# Patient Record
(demographics unavailable — no encounter records)

---

## 2024-12-05 NOTE — PHYSICAL EXAM
[Normal Sclera/Conjunctiva] : normal sclera/conjunctiva [Normal] : no joint swelling and grossly normal strength and tone [Coordination Grossly Intact] : coordination grossly intact [Speech Grossly Normal] : speech grossly normal [Memory Grossly Normal] : memory grossly normal [Alert and Oriented x3] : oriented to person, place, and time [de-identified] : Anxious [de-identified] : anxious

## 2024-12-05 NOTE — PHYSICAL EXAM
[Normal Sclera/Conjunctiva] : normal sclera/conjunctiva [Normal] : no joint swelling and grossly normal strength and tone [Coordination Grossly Intact] : coordination grossly intact [Speech Grossly Normal] : speech grossly normal [Memory Grossly Normal] : memory grossly normal [Alert and Oriented x3] : oriented to person, place, and time [de-identified] : Anxious [de-identified] : anxious

## 2024-12-05 NOTE — HEALTH RISK ASSESSMENT
[Monthly or less (1 pt)] : Monthly or less (1 point) [1 or 2 (0 pts)] : 1 or 2 (0 points) [Never (0 pts)] : Never (0 points) [Yes] : In the past 12 months have you used drugs other than those required for medical reasons? Yes [No falls in past year] : Patient reported no falls in the past year [0] : 2) Feeling down, depressed, or hopeless: Not at all (0) [Never] : Never [Audit-CScore] : 1 [de-identified] : marijuana [de-identified] : walking [de-identified] : ok [[id number]] : 0

## 2024-12-05 NOTE — HEALTH RISK ASSESSMENT
[Monthly or less (1 pt)] : Monthly or less (1 point) [1 or 2 (0 pts)] : 1 or 2 (0 points) [Never (0 pts)] : Never (0 points) [Yes] : In the past 12 months have you used drugs other than those required for medical reasons? Yes [No falls in past year] : Patient reported no falls in the past year [0] : 2) Feeling down, depressed, or hopeless: Not at all (0) [Never] : Never [Audit-CScore] : 1 [de-identified] : marijuana [de-identified] : walking [de-identified] : ok [ABD7Lvond] : 0

## 2024-12-05 NOTE — REVIEW OF SYSTEMS
[Fever] : no fever [Earache] : no earache [Chest Pain] : no chest pain [Shortness Of Breath] : no shortness of breath [Wheezing] : no wheezing [Dysuria] : no dysuria [Joint Pain] : no joint pain [Itching] : no itching [Headache] : no headache [Anxiety] : no anxiety

## 2024-12-05 NOTE — HISTORY OF PRESENT ILLNESS
[FreeTextEntry1] : ROUTINE F/U [de-identified] : sees cardiology Dr Flowers and stress test echo were negative 2 weeks ago last colonoscopy 2 yrs ago Dr SANTA PSYCHIATRY on mirtazapine and ativan continues to engage in intermittent binge driking

## 2024-12-05 NOTE — ASSESSMENT
[FreeTextEntry1] : ongoing psych issues drinks in pattern c/w binge EtOH disorder complete cardiology evaluation was negative[s/p stent] Dr Lancaster sees Dr Santana regularly UTD on colonoscopy

## 2024-12-05 NOTE — HISTORY OF PRESENT ILLNESS
[FreeTextEntry1] : ROUTINE F/U [de-identified] : sees cardiology Dr Flowers and stress test echo were negative 2 weeks ago last colonoscopy 2 yrs ago Dr SANTA PSYCHIATRY on mirtazapine and ativan continues to engage in intermittent binge driking

## 2024-12-05 NOTE — PLAN
[FreeTextEntry1] : BP is elevated patient believes situational as BP normal at home using wrist device will start Norvasc to RTO  3 weeks with BP device and readings to consider use of wegovy for weight management and EtOH management

## 2025-05-08 NOTE — PHYSICAL EXAM
[Left] : left shoulder [Moderate] : moderate [4 ___] : forward flexion 4[unfilled]/5 [] : no tenderness to palpation [Sitting] : sitting [4___] : internal rotation 4[unfilled]/5 [TWNoteComboBox7] : active forward flexion 170 degrees [TWNoteComboBox6] : internal rotation sacrum [de-identified] : external rotation 45 degrees

## 2025-05-08 NOTE — ASSESSMENT
[FreeTextEntry1] : Underlying pathology reviewed and treatment options discussed. 05/23/2024: OC c-spine x-rays, 2 views, reveal severe mid c-spine degenerative changes.  Lt shoulder x-rays 2v- reveal OA changes.  Obtain MRI LT shoulder R/O tear.  Activity modification as tolerated. Questions addressed.  05/08/2025: We reviewed his course.  He has significant weakness in the arm and has not improved in over a year. Obtain MRI of LT shoulder. R/O RTC tear.  Start PT and HEP to improve mechanics and reduce pain. I recommend he follows up with a neurologist to eval for radiation down the spine.  Questions answered.  Follow up after MRI LT shoulder   The documentation recorded by the scribe accurately reflects the service I personally performed and the decisions made by me. I, Chau Morales, attest that this documentation has been prepared under the direction and in the presence of Provider Liborio Little MD.  The patient was seen by Liborio Little MD.

## 2025-05-08 NOTE — HISTORY OF PRESENT ILLNESS
[Gradual] : gradual [Result of repetitive motion] : result of repetitive motion [Constant] : constant [Nothing helps with pain getting better] : Nothing helps with pain getting better [Retired] : Work status: retired [de-identified] : 05/08/25- follow up for left shoulder, stated MRI was denied. stated pain lately has been getting worse. bit of numbness in fingertips. stated difficulties with lifting right arm continue. He reports upper abdominal pain/tightness. Went to his cardiologist and gastroenterologist, got work up done and everything was fine. Had CT scan of abdomen done abt 3 months ago which was also fine.    05/23/2024: 68 y/o RHD male c/o chronic left arm weakness. He did have an injury 8-9 years when he fell onto his arm. Notes difficulty keeping his arm raised or lifting items in from of him. Occasional tingling.   [] : Post Surgical Visit: no [FreeTextEntry5] : old injury from few years ago  [FreeTextEntry6] : weakness

## 2025-05-20 NOTE — DISCUSSION/SUMMARY
[de-identified] : The patient was advised of the diagnosis. The natural history of the pathology was explained in full to the patient in layman's terms. The risks and benefits of surgical and non-surgical treatment alternatives were explained in full to the patient. All questions were answered.

## 2025-05-20 NOTE — DATA REVIEWED
[MRI] : MRI [Left] : left [Shoulder] : shoulder [Report was reviewed and noted in the chart] : The report was reviewed and noted in the chart [I independently reviewed and interpreted images and report] : I independently reviewed and interpreted images and report [FreeTextEntry1] : MRI LT shoulder OC 05/14/25: 1. 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  2. Rotator cuff tendinopathy, biceps tenosynovitis, AC joint arthrosis and lateral acromial bone spurs without acute fracture or loose body.  3. Severe deltoid and infraspinatus muscle atrophy and mild superior subscapularis muscle atrophy of uncertain clinical significance and etiology.

## 2025-05-20 NOTE — DISCUSSION/SUMMARY
[de-identified] : The patient was advised of the diagnosis. The natural history of the pathology was explained in full to the patient in layman's terms. The risks and benefits of surgical and non-surgical treatment alternatives were explained in full to the patient. All questions were answered.

## 2025-05-20 NOTE — HISTORY OF PRESENT ILLNESS
[Gradual] : gradual [Result of repetitive motion] : result of repetitive motion [Constant] : constant [Nothing helps with pain getting better] : Nothing helps with pain getting better [Retired] : Work status: retired [de-identified] : 05/20/2025: Patient returns for LT shoulder FUV. He still c/o pain with forward flexion. Here to review MRI.   05/08/25- follow up for left shoulder, stated MRI was denied. stated pain lately has been getting worse. bit of numbness in fingertips. stated difficulties with lifting right arm continue. He reports upper abdominal pain/tightness. Went to his cardiologist and gastroenterologist, got work up done and everything was fine. Had CT scan of abdomen done abt 3 months ago which was also fine.    05/23/2024: 66 y/o RHD male c/o chronic left arm weakness. He did have an injury 8-9 years when he fell onto his arm. Notes difficulty keeping his arm raised or lifting items in from of him. Occasional tingling.   [] : Post Surgical Visit: no [FreeTextEntry5] : old injury from few years ago  [FreeTextEntry6] : weakness

## 2025-05-20 NOTE — PHYSICAL EXAM
[Left] : left shoulder [Sitting] : sitting [Moderate] : moderate [4 ___] : forward flexion 4[unfilled]/5 [4___] : internal rotation 4[unfilled]/5 [] : no tenderness to palpation [TWNoteComboBox7] : active forward flexion 170 degrees [TWNoteComboBox6] : internal rotation sacrum [de-identified] : external rotation 45 degrees

## 2025-05-20 NOTE — ASSESSMENT
[FreeTextEntry1] : Underlying pathology reviewed and treatment options discussed. 05/23/2024: OC c-spine x-rays, 2 views, reveal severe mid c-spine degenerative changes.  Lt shoulder x-rays 2v- reveal OA changes.  Obtain MRI LT shoulder R/O tear.  Activity modification as tolerated. Questions addressed.  05/08/2025: We reviewed his course.  He has significant weakness in the arm and has not improved in over a year. Obtain MRI of LT shoulder. R/O RTC tear.  Start PT and HEP to improve mechanics and reduce pain. I recommend he follows up with a neurologist to eval for radiation down the spine.  Questions answered.  Follow up after MRI LT shoulder   05/20/2025: MRI reviewed and discussed. Based on my independent interpretation of the MRI images there's evidence suggesting GH joint instability, prior anterior shoulder dislocation, and severe deltoid and infraspinatus muscle atrophy.   Underlying pathology and treatment options discussed.  Obtain EMG of LT UE.  Activity modification as tolerated. Questions addressed. Follow up after EMG.  The documentation recorded by the scribe accurately reflects the service I personally performed and the decisions made by me. I, Chau Morales, attest that this documentation has been prepared under the direction and in the presence of Provider Liborio Little MD.  The patient was seen by Liborio Little MD.

## 2025-05-20 NOTE — HISTORY OF PRESENT ILLNESS
[Gradual] : gradual [Result of repetitive motion] : result of repetitive motion [Constant] : constant [Nothing helps with pain getting better] : Nothing helps with pain getting better [Retired] : Work status: retired [de-identified] : 05/20/2025: Patient returns for LT shoulder FUV. He still c/o pain with forward flexion. Here to review MRI.   05/08/25- follow up for left shoulder, stated MRI was denied. stated pain lately has been getting worse. bit of numbness in fingertips. stated difficulties with lifting right arm continue. He reports upper abdominal pain/tightness. Went to his cardiologist and gastroenterologist, got work up done and everything was fine. Had CT scan of abdomen done abt 3 months ago which was also fine.    05/23/2024: 68 y/o RHD male c/o chronic left arm weakness. He did have an injury 8-9 years when he fell onto his arm. Notes difficulty keeping his arm raised or lifting items in from of him. Occasional tingling.   [] : Post Surgical Visit: no [FreeTextEntry5] : old injury from few years ago  [FreeTextEntry6] : weakness

## 2025-05-20 NOTE — PHYSICAL EXAM
[Left] : left shoulder [Sitting] : sitting [Moderate] : moderate [4 ___] : forward flexion 4[unfilled]/5 [4___] : internal rotation 4[unfilled]/5 [] : no tenderness to palpation [TWNoteComboBox7] : active forward flexion 170 degrees [TWNoteComboBox6] : internal rotation sacrum [de-identified] : external rotation 45 degrees

## 2025-06-04 NOTE — PHYSICAL EXAM
[Left] : left shoulder [Sitting] : sitting [Moderate] : moderate [4 ___] : forward flexion 4[unfilled]/5 [4___] : internal rotation 4[unfilled]/5 [] : motor and sensory intact distally [TWNoteComboBox7] : active forward flexion 170 degrees [TWNoteComboBox6] : internal rotation sacrum [de-identified] : external rotation 45 degrees

## 2025-06-04 NOTE — ASSESSMENT
[FreeTextEntry1] : Underlying pathology reviewed and treatment options discussed. 05/23/2024: OC c-spine x-rays, 2 views, reveal severe mid c-spine degenerative changes.  Lt shoulder x-rays 2v- reveal OA changes.  Obtain MRI LT shoulder R/O tear.  Activity modification as tolerated. Questions addressed.  05/08/2025: We reviewed his course.  He has significant weakness in the arm and has not improved in over a year. Obtain MRI of LT shoulder. R/O RTC tear.  Start PT and HEP to improve mechanics and reduce pain. I recommend he follows up with a neurologist to eval for radiation down the spine.  Questions answered.  Follow up after MRI LT shoulder   05/20/2025: MRI reviewed and discussed. Based on my independent interpretation of the MRI images there's evidence suggesting GH joint instability, prior anterior shoulder dislocation, and severe deltoid and infraspinatus muscle atrophy.   Underlying pathology and treatment options discussed.  Obtain EMG of LT UE.  Activity modification as tolerated. Questions addressed. Follow up after EMG.  06/05/2025: We reviewed EMG.  Underlying pathology and treatment options discussed. Activity modification as tolerated. Questions addressed. Follow up in  The documentation recorded by the scribe accurately reflects the service I personally performed and the decisions made by me. I, Chau Morales, attest that this documentation has been prepared under the direction and in the presence of Provider Liborio Little MD.  The patient was seen by Liborio Little MD.

## 2025-06-04 NOTE — HISTORY OF PRESENT ILLNESS
[de-identified] : 06/05/2025: Patient is here for LT shoulder FUV. Here to review EMG results.   05/20/2025: Patient returns for LT shoulder FUV. He still c/o pain with forward flexion. Here to review MRI.   05/08/25- follow up for left shoulder, stated MRI was denied. stated pain lately has been getting worse. bit of numbness in fingertips. stated difficulties with lifting right arm continue. He reports upper abdominal pain/tightness. Went to his cardiologist and gastroenterologist, got work up done and everything was fine. Had CT scan of abdomen done abt 3 months ago which was also fine.    05/23/2024: 66 y/o RHD male c/o chronic left arm weakness. He did have an injury 8-9 years when he fell onto his arm. Notes difficulty keeping his arm raised or lifting items in from of him. Occasional tingling.   [Gradual] : gradual [Result of repetitive motion] : result of repetitive motion [Constant] : constant [Nothing helps with pain getting better] : Nothing helps with pain getting better [Retired] : Work status: retired [] : Post Surgical Visit: no [FreeTextEntry5] : old injury from few years ago  [FreeTextEntry6] : weakness

## 2025-06-04 NOTE — DATA REVIEWED
[FreeTextEntry1] : EMG done on 05/28/25: 1. Left greater than right, predominantly chronic C5 C6>C7 radiculopathy. 2. 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 This is consistent with the clinical diagnosis of mild left carpal tunnel syndrome.  Patient tolerated the study well, hemostasis was maintained throughout.   --previously reviewed-- MRI LT shoulder OC 05/14/25: 1. 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  2. Rotator cuff tendinopathy, biceps tenosynovitis, AC joint arthrosis and lateral acromial bone spurs without acute fracture or loose body.  3. Severe deltoid and infraspinatus muscle atrophy and mild superior subscapularis muscle atrophy of uncertain clinical significance and etiology.

## 2025-06-04 NOTE — DISCUSSION/SUMMARY
[de-identified] : The patient was advised of the diagnosis. The natural history of the pathology was explained in full to the patient in layman's terms. The risks and benefits of surgical and non-surgical treatment alternatives were explained in full to the patient. All questions were answered.

## 2025-06-27 NOTE — DATA REVIEWED
Sang Koroma's goals for this visit include:   Chief Complaint   Patient presents with     Derm Problem     Wart tx -cryotherapy       He requests these members of his care team be copied on today's visit information: no    PCP: Mian Hunt    Referring Provider:  No referring provider defined for this encounter.    There were no vitals taken for this visit.    Do you need any medication refills at today's visit? No  Cynthia Luz LPN       [MRI] : MRI [Cervical Spine] : cervical spine [Report was reviewed and noted in the chart] : The report was reviewed and noted in the chart [I independently reviewed and interpreted images and report] : I independently reviewed and interpreted images and report

## 2025-06-30 NOTE — HISTORY OF PRESENT ILLNESS
[Neck] : neck [] : yes [de-identified] : 6/27/25:  69 yo RHF M - here as a NC from dr Little   left arm weakness and atrophy  with neck ROM gets shooting pain down the body   chest tightness into the right abdominal area  he saw cardio and was told it was  MRi C spine - see report - [facility]  by my read -   MRi L spine - see report - [facility]   emg/ncs 5/282/5 - left greater than right C5,6,7 radiculopathy , mild left CTS   xrays reviewed: C spine - severe spondylosis, loss of disc height L shoulder - negative   anxiety    [FreeTextEntry5] : Here for neck evaluation. Has seen Dr. Little- x-ray/MRI/EMG performed. Continued weakness of L arm.

## 2025-06-30 NOTE — DISCUSSION/SUMMARY
[Medication Risks Reviewed] : Medication risks reviewed [de-identified] : reviewed the case and the imaging with the patient  cervical stenosis/radiculopathy  myelopathy with myelomalacia  discussion that I would rec surgery for this  discussion of the condition and treatment options do not really see a great nonsurgical option  cautions discussed questions answered discussion of natural history of the condition and what the next step would be CT scan of the C spine   fu to review the imaging   for the thryoid he should see the PCP OR ENDOCRINE  discussed thyroid not related to the cervical stenosis

## 2025-06-30 NOTE — PHYSICAL EXAM
[Left] : left shoulder [] : tenderness to palpation [FreeTextEntry9] : weakness with ROM of the neck